# Patient Record
Sex: MALE | Race: WHITE | NOT HISPANIC OR LATINO | Employment: FULL TIME | URBAN - METROPOLITAN AREA
[De-identification: names, ages, dates, MRNs, and addresses within clinical notes are randomized per-mention and may not be internally consistent; named-entity substitution may affect disease eponyms.]

---

## 2023-11-29 ENCOUNTER — OFFICE VISIT (OUTPATIENT)
Dept: URGENT CARE | Facility: PHYSICIAN GROUP | Age: 28
End: 2023-11-29
Payer: COMMERCIAL

## 2023-11-29 VITALS
BODY MASS INDEX: 35.19 KG/M2 | SYSTOLIC BLOOD PRESSURE: 126 MMHG | HEART RATE: 80 BPM | TEMPERATURE: 98.3 F | WEIGHT: 251.32 LBS | RESPIRATION RATE: 16 BRPM | HEIGHT: 71 IN | DIASTOLIC BLOOD PRESSURE: 80 MMHG | OXYGEN SATURATION: 97 %

## 2023-11-29 DIAGNOSIS — J06.9 VIRAL URI WITH COUGH: ICD-10-CM

## 2023-11-29 DIAGNOSIS — J02.9 SORE THROAT: ICD-10-CM

## 2023-11-29 LAB
FLUAV RNA SPEC QL NAA+PROBE: NEGATIVE
FLUBV RNA SPEC QL NAA+PROBE: NEGATIVE
RSV RNA SPEC QL NAA+PROBE: NEGATIVE
S PYO DNA SPEC NAA+PROBE: NOT DETECTED
SARS-COV-2 RNA RESP QL NAA+PROBE: NEGATIVE

## 2023-11-29 PROCEDURE — 3074F SYST BP LT 130 MM HG: CPT | Performed by: STUDENT IN AN ORGANIZED HEALTH CARE EDUCATION/TRAINING PROGRAM

## 2023-11-29 PROCEDURE — 3079F DIAST BP 80-89 MM HG: CPT | Performed by: STUDENT IN AN ORGANIZED HEALTH CARE EDUCATION/TRAINING PROGRAM

## 2023-11-29 PROCEDURE — 0241U POCT CEPHEID COV-2, FLU A/B, RSV - PCR: CPT | Performed by: STUDENT IN AN ORGANIZED HEALTH CARE EDUCATION/TRAINING PROGRAM

## 2023-11-29 PROCEDURE — 87651 STREP A DNA AMP PROBE: CPT | Performed by: STUDENT IN AN ORGANIZED HEALTH CARE EDUCATION/TRAINING PROGRAM

## 2023-11-29 PROCEDURE — 99203 OFFICE O/P NEW LOW 30 MIN: CPT | Performed by: STUDENT IN AN ORGANIZED HEALTH CARE EDUCATION/TRAINING PROGRAM

## 2023-11-29 RX ORDER — BENZONATATE 100 MG/1
100 CAPSULE ORAL 3 TIMES DAILY PRN
Qty: 60 CAPSULE | Refills: 0 | Status: SHIPPED | OUTPATIENT
Start: 2023-11-29

## 2023-11-29 RX ORDER — ALBUTEROL SULFATE 90 UG/1
2 AEROSOL, METERED RESPIRATORY (INHALATION) EVERY 6 HOURS PRN
Qty: 8.5 G | Refills: 0 | Status: SHIPPED | OUTPATIENT
Start: 2023-11-29

## 2023-11-29 ASSESSMENT — ENCOUNTER SYMPTOMS
WHEEZING: 0
PALPITATIONS: 0
SHORTNESS OF BREATH: 0
CHILLS: 1
MYALGIAS: 1
SORE THROAT: 1
FEVER: 1
COUGH: 1
HEADACHES: 0

## 2023-11-29 NOTE — PROGRESS NOTES
"Subjective     Edson Pichardo is a 28 y.o. male who presents with Other (Been feeling ill x last 3 days with sore throat, fever, cough and body aches)            Edson is a 28 y.o. urgent care with symptoms of fever/chills, body aches, sore throat, cough and nasal congestion.  Symptoms started on Sunday.  Patient has been taking OTC DayQuil which has helped somewhat with symptoms.  No shortness of breath/wheezing but he does have some chest congestion.        Review of Systems   Constitutional:  Positive for chills, fever and malaise/fatigue.   HENT:  Positive for congestion and sore throat. Negative for ear pain.    Respiratory:  Positive for cough. Negative for shortness of breath and wheezing.    Cardiovascular:  Negative for chest pain and palpitations.   Musculoskeletal:  Positive for myalgias.   Neurological:  Negative for headaches.   All other systems reviewed and are negative.             Objective     /80 (BP Location: Right arm, Patient Position: Sitting, BP Cuff Size: Adult long)   Pulse 80   Temp 36.8 °C (98.3 °F) (Temporal)   Resp 16   Ht 1.803 m (5' 11\")   Wt 114 kg (251 lb 5.2 oz)   SpO2 97%   BMI 35.05 kg/m²      Physical Exam  Vitals reviewed.   Constitutional:       Appearance: Normal appearance.   HENT:      Head: Normocephalic and atraumatic.      Right Ear: Tympanic membrane, ear canal and external ear normal.      Left Ear: Tympanic membrane, ear canal and external ear normal.      Nose: Nose normal.      Mouth/Throat:      Lips: Pink.      Mouth: Mucous membranes are moist.      Pharynx: Oropharynx is clear. Posterior oropharyngeal erythema present.   Eyes:      Extraocular Movements: Extraocular movements intact.      Conjunctiva/sclera: Conjunctivae normal.      Pupils: Pupils are equal, round, and reactive to light.   Cardiovascular:      Rate and Rhythm: Normal rate and regular rhythm.   Pulmonary:      Effort: Pulmonary effort is normal.      Breath sounds: Normal " breath sounds.   Skin:     General: Skin is warm and dry.   Neurological:      General: No focal deficit present.      Mental Status: He is alert. Mental status is at baseline.                             Assessment & Plan          1. Viral URI with cough  - POCT CoV-2, Flu A/B, RSV by PCR  - albuterol 108 (90 Base) MCG/ACT Aero Soln inhalation aerosol; Inhale 2 Puffs every 6 hours as needed for Shortness of Breath.  Dispense: 8.5 g; Refill: 0  - benzonatate (TESSALON) 100 MG Cap; Take 1 Capsule by mouth 3 times a day as needed for Cough.  Dispense: 60 Capsule; Refill: 0    2. Sore throat  - POCT CEPHEID GROUP A STREP - PCR     Differential diagnoses, supportive care measures and indications for immediate follow-up discussed with patient. Pathogenesis of diagnosis discussed including typical length and natural progression.      Instructed to return to urgent care or nearest emergency department if symptoms fail to improve, for any change in condition, further concerns, or new concerning symptoms.    Patient states understanding and agrees with the plan of care and discharge instructions.

## 2023-11-29 NOTE — LETTER
November 29, 2023    To Whom It May Concern:         This is confirmation that Edson Pichardo attended his scheduled appointment with Concepcion Arias P.A.-C. on 11/29/23. Please excuse work absences through 12/2/23 for medical reasons. Edson can return to work without restrictions on 12/3/23 or earlier as long as symptoms have improved/resolved and there has been no fever for 24 hours.         If you have any questions please do not hesitate to call me at the phone number listed below.    Sincerely,      Concepcion Airas P.A.-C.  [phone]

## 2024-04-02 ENCOUNTER — OCCUPATIONAL MEDICINE (OUTPATIENT)
Dept: URGENT CARE | Facility: CLINIC | Age: 29
End: 2024-04-02
Payer: COMMERCIAL

## 2024-04-02 VITALS
DIASTOLIC BLOOD PRESSURE: 84 MMHG | RESPIRATION RATE: 16 BRPM | HEIGHT: 71 IN | OXYGEN SATURATION: 96 % | BODY MASS INDEX: 34.52 KG/M2 | TEMPERATURE: 97.6 F | WEIGHT: 246.6 LBS | HEART RATE: 71 BPM | SYSTOLIC BLOOD PRESSURE: 124 MMHG

## 2024-04-02 DIAGNOSIS — M77.8 TENDINITIS OF BOTH WRISTS: ICD-10-CM
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
## 2024-04-02 PROCEDURE — 3079F DIAST BP 80-89 MM HG: CPT | Performed by: PHYSICIAN ASSISTANT

## 2024-04-02 PROCEDURE — 99214 OFFICE O/P EST MOD 30 MIN: CPT | Performed by: PHYSICIAN ASSISTANT

## 2024-04-02 PROCEDURE — 3074F SYST BP LT 130 MM HG: CPT | Performed by: PHYSICIAN ASSISTANT

## 2024-04-02 NOTE — LETTER
[facility] [place] [address] [facility] [place] [address] [address]  Phone:  [phone] - Fax:  [phone]   [facility] Progress Report and Disability Certification  Date of Service: 4/2/2024   No Show:  No  Date / Time of Next Visit: 4/9/2024   Claim Information   Patient Name: Edson Pichardo III  Claim Number:     Employer: [employer]  Date of Injury: 8/16/2023     Insurer / TPA: [employer]  ID / SSN:     Occupation:   Diagnosis: Diagnoses of Overuse injury and Tendinitis of both wrists were pertinent to this visit.    Medical Information   Related to Industrial Injury? Yes    Subjective Complaints:  DOI: 8/16/2023.  Bilateral wrist pain.  There is no traumatic injury event or precipitating injury.  He notes he has chronic recurrent bilateral wrist pain left worse than right.  He attributes his pain to repetitive motion and fine motor manipulations associated to his job.  He has no history of previous wrist pain.  Symptoms all started once he began this job.  Pain is in the bilateral radial wrist into the mid forearm.  There is no swelling, deformity, erythema or ecchymosis.  Intermittent tingling but no significant numbness or weakness.  He has tried OTC meds and conservative measures without relief.  He notes symptoms are better on his days off.  No previous injury.  No second job.   Objective Findings: No significant swelling, deformity or ecchymosis.  Slight TTP on the radial wrist into the mid forearm.  Full passive and active range of motion without crepitus.  Slight pain with full flexion.  Negative Phalen test, negative Finkelstein test, negative Tinel's sign.   strength and distal neurovascular intact.   Pre-Existing Condition(s):     Assessment:   Initial Visit    Status: Additional Care Required  Permanent Disability:No    Plan: Medication    Diagnostics:      Comments:       Disability Information   Status: Released to Restricted  Duty    From:  4/2/2024  Through: 4/9/2024 Restrictions are: Temporary   Physical Restrictions   Sitting:    Standing:    Stooping:    Bending:      Squatting:    Walking:    Climbing:    Pushing:  < or = to 4 hrs/day   Pulling:  < or = to 4 hrs/day Other:    Reaching Above Shoulder (L):   Reaching Above Shoulder (R): < or = 4 hrs/day     Reaching Below Shoulder (L):    Reaching Below Shoulder (R):      Not to exceed Weight Limits   Carrying(hrs):   Weight Limit(lb): < or = to 10 pounds Lifting(hrs):   Weight  Limit(lb): < or = to 10 pounds   Comments: Avoid repetitive motion for 1 week.    Repetitive Actions   Hands: i.e. Fine Manipulations from Grasping: < or = to 4 hrs/day   Feet: i.e. Operating Foot Controls:     Driving / Operate Machinery:     Health Care Provider’s Original or Electronic Signature  Irvin De La Cruz P.A.-C. Health Care Provider’s Original or Electronic Signature    Rell Clay DO MPH     Clinic Name / Location: [address] [address] Clinic Phone Number: Dept: [phone]   Appointment Time: 12:45 Pm Visit Start Time: 1:48 PM   Check-In Time:  1:06 Pm Visit Discharge Time:  2:30 PM   Original-Treating Physician or Chiropractor    Page 2-Insurer/TPA    Page 3-Employer    Page 4-Employee

## 2024-04-02 NOTE — LETTER
"    EMPLOYEE’S CLAIM FOR COMPENSATION/ REPORT OF INITIAL TREATMENT  FORM C-4  PLEASE TYPE OR PRINT    EMPLOYEE’S CLAIM - PROVIDE ALL INFORMATION REQUESTED   First Name                    STAR Sandhu Last Name  Marino Birthdate                    1995                Sex  []M  []F Claim Number (Insurer’s Use Only)     Home Address  [address] Age  29 y.o. Height  1.803 m (5' 11\") Weight  112 kg (246 lb 9.6 oz) Social Security Number     [facility] Zip  [address] Telephone  [phone] (home)    Mailing Address  [address] [facility] [place] [facility] Zip  [address] Primary Language Spoken  English    INSURER   THIRD-PARTY   [employer]   Employee's Occupation (Job Title) When Injury or Occupational Disease Occurred      Employer's Name/Company Name  [employer]  Telephone  [phone]    Office Mail Address (Number and Street)  [address]     Date of Injury (if applicable) 8/16/2023               Hours Injury (if applicable)  11:00 PM Date Employer Notified  3/29/2024 Last Day of Work after Injury or Occupational Disease  3/30/2024 Supervisor to Whom Injury     Reported  Michaela Payne   Address or Location of Accident (if applicable)  Work [1]   What were you doing at the time of accident? (if applicable)  N/A    How did this injury or occupational disease occur? (Be specific and answer in detail. Use additional sheet if necessary)  Prolonged and repetitive movements wearing on my arms.   If you believe that you have an occupational disease, when did you first have knowledge of the disability and its relationship to your employment?  N/A Witnesses to the Accident (if applicable)  N/A      Nature of Injury or Occupational Disease  Workers' Compensation  Part(s) of Body Injured or Affected  Lower Arm (L) Lower Arm (R) N/A    I CERTIFY THAT THE ABOVE IS TRUE " AND CORRECT TO T HE BEST OF MY KNOWLEDGE AND THAT I HAVE PROVIDED THIS INFORMATION IN ORDER TO OBTAIN THE BENEFITS OF [place], OR [place]).  I HEREBY AUTHORIZE ANY PHYSICIAN, CHIROPRACTOR, SURGEON, PRACTITIONER OR ANY OTHER PERSON, ANY HOSPITAL, INCLUDING [facility] OR [facility], ANY  MEDICAL SERVICE ORGANIZATION, ANY INSURANCE COMPANY, OR OTHER INSTITUTION OR ORGANIZATION TO RELEASE TO EACH OTHER, ANY MEDICAL OR OTHER INFORMATION, INCLUDING BENEFITS PAID OR PAYABLE, PERTINENT TO THIS INJURY OR DISEASE, EXCEPT INFORMATION RELATIVE TO DIAGNOSIS, TREATMENT AND/OR COUNSELING FOR AIDS, PSYCHOLOGICAL CONDITIONS, ALCOHOL OR CONTROLLED SUBSTANCES, FOR WHICH I MUST GIVE SPECIFIC AUTHORIZATION.  A PHOTOSTAT OF THIS AUTHORIZATION SHALL BE VALID AS THE ORIGINAL.     Date 4/2/24   [facility] Employee’s Original or  *Electronic Signature   THIS REPORT MUST BE COMPLETED AND MAILED WITHIN 3 WORKING DAYS OF TREATMENT   [facility]    Name of Facility  [facility]   Date 4/2/2024 Diagnosis and Description of Injury or Occupational Disease  (X50.3XXA) Overuse injury  (M77.8) Tendinitis of both wrists  Diagnoses of Overuse injury and Tendinitis of both wrists were pertinent to this visit. Is there evidence that the injured employee was under the influence of alcohol and/or another controlled substance at the time of accident?  []No  [] Yes (if yes, please explain)   Hour 1:48 PM  No   Treatment: Overuse/tendinitis type injury  RICE therapy and OTC meds    Have you advised the patient to remain off work five days or more?   [] Yes Indicate dates: From   To    []No If no, is the injured employee capable of: [] full duty [] modified duty                                                             No  Yes  If modified duty, specify any limitations / restrictions:  No pushing or pulling for more than 4  hours.  No fine motor manipulations with grasping for more than 4 hours.  10 pound weight limit.  Recommend avoiding repetitive motions of his wrist for 1 week.                                                                                                                                                                                                                                                                                                                                                                                                               X-Ray Findings:      From information given by the employee, together with medical evidence, can you directly connect this injury or occupational disease as job incurred?  []Yes   [] No Yes    Is additional medical care by a physician indicated? []Yes [] No  Yes    Do you know of any previous injury or disease contributing to this condition or occupational disease? []Yes [] No (Explain if yes)                          No   Date  4/2/2024 Print Health Care Provider’s Name  Bobbi De La Cruz P.A.-C. I certify that the employer’s copy of  this form was delivered to the employer on:   Address  [address] [place] [address] INSURER'S USE ONLY                       [facility]  [address] Provider’s Tax ID Number  [id number]   Telephone  Dept: [phone]    Health Care Provider’s Original or Electronic Signature  e-BOBBI Nguyen P.A.-C. Degree (MD,DO, DC,PANoeC,APRN)  PA-C  Choose (if applicable)      ORIGINAL - TREATING HEALTHCARE PROVIDER PAGE 2 - INSURER/TPA PAGE 3 - EMPLOYER PAGE 4 - EMPLOYEE             Form C-4 (rev.08/23)        BRIEF DESCRIPTION OF RIGHTS AND BENEFITS  (Pursuant to [place] 616[place])    Notice of Injury or Occupational Disease (Incident Report Form C-1): If an injury or occupational disease (OD) arises out of and in the course of employment, you must provide written notice to your employer as soon as practicable, but no later than 7 days after  "the accident or OD. Your employer shall maintain a sufficient supply of the required forms.    Claim for Compensation (Form C-4): If medical treatment is sought, the form C-4 is available at the place of initial treatment. A completed \"Claim for Compensation\" (Form C-4) must be filed within 90 days after an accident or OD. The treating physician or chiropractor must, within 3 working days after treatment, complete and mail to the employer, the employer's insurer and third-party , the Claim for Compensation.    Medical Treatment: If you require medical treatment for your on-the-job injury or OD, you may be required to select a physician or chiropractor from a list provided by your workers’ compensation insurer, if it has contracted with an Organization for Managed Care (MCO) or Preferred Provider Organization (PPO) or providers of health care. If your employer has not entered into a contract with an MCO or PPO, you may select a physician or chiropractor from the Panel of Physicians and Chiropractors. Any medical costs related to your industrial injury or OD will be paid by your insurer.    Temporary Total Disability (TTD): If your doctor has certified that you are unable to work for a period of at least 5 consecutive days, or 5 cumulative days in a 20-day period, or places restrictions on you that your employer does not accommodate, you may be entitled to TTD compensation.    Temporary Partial Disability (TPD): If the wage you receive upon reemployment is less than the compensation for TTD to which you are entitled, the insurer may be required to pay you TPD compensation to make up the difference. TPD can only be paid for a maximum of 24 months.    Permanent Partial Disability (PPD): When your medical condition is stable and there is an indication of a PPD as a result of your injury or OD, within 30 days, your insurer must arrange for an evaluation by a rating physician or chiropractor to determine the " degree of your PPD. The amount of your PPD award depends on the date of injury, the results of the PPD evaluation, your age and wage.    Permanent Total Disability (PTD): If you are medically certified by a treating physician or chiropractor as permanently and totally disabled and have been granted a PTD status by your insurer, you are entitled to receive monthly benefits not to exceed 66 2/3% of your average monthly wage. The amount of your PTD payments is subject to reduction if you previously received a lump-sum PPD award.    Vocational Rehabilitation Services: You may be eligible for vocational rehabilitation services if you are unable to return to the job due to a permanent physical impairment or permanent restrictions as a result of your injury or occupational disease.    Transportation and Per Johann Reimbursement: You may be eligible for travel expenses and per johann associated with medical treatment.    Reopening: You may be able to reopen your claim if your condition worsens after claim closure.     Appeal Process: If you disagree with a written determination issued by the insurer or the insurer does not respond to your request, you may appeal to the Department of Administration, , by following the instructions contained in your determination letter. You must appeal the determination within 70 days from the date of the determination letter at [address], [address], [place], [place] [address], or [address] [facility] [address], [place] [address]. If you disagree with the  decision, you may appeal to the Department of Administration, . You must file your appeal within 30 days from the date of the  decision letter at [address], [address], [place] [address], or [address] [facility] [address], [place] [address]. If you disagree with a decision of an , you may file a petition for judicial review  with the District Court. You must do so within 30 days of the Appeal Officer’s decision. You may be represented by an  at your own expense or you may contact the [facility] for possible representation.    [place]  for Injured Workers (NAIW): If you disagree with a  decision, you may request that NAIW represent you without charge at an  Hearing. For information regarding denial of benefits, you may contact the [facility] at: [address], [address], [place] [address], [phone], or [address], [place] [address], [phone]    To File a Complaint with the Division: If you wish to file a complaint with the  of the Division of Industrial Relations (DIR),  please contact the Workers’ Compensation Section, [address], [address], [place], [place] [address], telephone [phone], or [address], [place], [place] [address], telephone [phone].    For assistance with Workers’ Compensation Issues: You may contact the [facility] Office for Consumer Health Assistance, [address], [place], [place] [address], Toll Free 1-739.459.8125, Web site: http://[facility].[place].gov/Programs/RENEA E-mail: [email or web address].[facility]              __________________________________________________________________                                    _________________            Employee Name / Signature                                                                                                                            Date                                                                                                                                                                                                                              D-2 (rev. 10/20)

## 2024-04-03 NOTE — PROGRESS NOTES
"Subjective     Edson Pichardo III is a very pleasant 29 y.o. male who presents with Arm Pain (WC NEW DOI:8/16/23, Bilateral arm and wrist pain x 6 months)      DOI: 8/16/2023.  Bilateral wrist pain.  There is no traumatic injury event or precipitating injury.  He notes he has chronic recurrent bilateral wrist pain left worse than right.  He attributes his pain to repetitive motion and fine motor manipulations associated to his job.  He has no history of previous wrist pain.  Symptoms all started once he began this job.  Pain is in the bilateral radial wrist into the mid forearm.  There is no swelling, deformity, erythema or ecchymosis.  Intermittent tingling but no significant numbness or weakness.  He has tried OTC meds and conservative measures without relief.  He notes symptoms are better on his days off.  No previous injury.  No second job.     Arm Pain         ROS           Objective     /84 (BP Location: Left arm, Patient Position: Sitting, BP Cuff Size: Large adult)   Pulse 71   Temp 36.4 °C (97.6 °F) (Temporal)   Resp 16   Ht 1.803 m (5' 11\")   Wt 112 kg (246 lb 9.6 oz)   SpO2 96%   BMI 34.39 kg/m²      Physical Exam    No significant swelling, deformity or ecchymosis.  Slight TTP on the radial wrist into the mid forearm.  Full passive and active range of motion without crepitus.  Slight pain with full flexion.  Negative Phalen test, negative Finkelstein test, negative Tinel's sign.   strength and distal neurovascular intact.                   Assessment & Plan        1. Overuse injury        2. Tendinitis of both wrists          This is a very pleasant 29-year-old male presenting with overuse tendinitis type injury of bilateral wrists left worse than right.  He will be placed on work restrictions.  He will use RICE therapy and OTC meds.  He will follow-up in 1 week, may need imaging or referral.      Return to clinic or go to ED if symptoms worsen or persist. Red flag symptoms and " indications for ED discussed at length. Patient voices understanding. All side effects and potential interactions of medication discussed including allergic response, GI upset, sedation, etc.    Please note that this dictation was created using voice recognition software. I have made every reasonable attempt to correct obvious errors, but I expect that there are errors of grammar and possibly content that I did not discover before finalizing the note.

## 2024-04-09 ENCOUNTER — OCCUPATIONAL MEDICINE (OUTPATIENT)
Dept: URGENT CARE | Facility: CLINIC | Age: 29
End: 2024-04-09
Payer: COMMERCIAL

## 2024-04-09 VITALS
DIASTOLIC BLOOD PRESSURE: 72 MMHG | SYSTOLIC BLOOD PRESSURE: 128 MMHG | TEMPERATURE: 97.6 F | HEART RATE: 76 BPM | BODY MASS INDEX: 34.44 KG/M2 | HEIGHT: 71 IN | WEIGHT: 246 LBS | RESPIRATION RATE: 16 BRPM | OXYGEN SATURATION: 96 %

## 2024-04-09 DIAGNOSIS — M77.8 TENDINITIS OF BOTH WRISTS: ICD-10-CM

## 2024-04-09 PROCEDURE — 3078F DIAST BP <80 MM HG: CPT | Performed by: NURSE PRACTITIONER

## 2024-04-09 PROCEDURE — 99213 OFFICE O/P EST LOW 20 MIN: CPT | Performed by: NURSE PRACTITIONER

## 2024-04-09 PROCEDURE — 3074F SYST BP LT 130 MM HG: CPT | Performed by: NURSE PRACTITIONER

## 2024-04-09 NOTE — LETTER
[facility] [place] [id number] [facility] [address] [place] [address]  Phone:  [phone] - Fax:  [phone]   [facility] Progress Report and Disability Certification  Date of Service: 4/9/2024   No Show:  No  Date / Time of Next Visit: 4/16/2024   Claim Information   Patient Name: Edson Pichardo III  Claim Number:     Employer: [employer]  Date of Injury: 8/16/2023     Insurer / TPA: [employer]  ID / SSN:     Occupation:   Diagnosis: The encounter diagnosis was Tendinitis of both wrists.    Medical Information   Related to Industrial Injury? Yes    Subjective Complaints:  COPIED FROM PREVIOUS VISIT: DOI: 8/16/2023.  Bilateral wrist pain.  There is no traumatic injury event or precipitating injury.  He notes he has chronic recurrent bilateral wrist pain left worse than right.  He attributes his pain to repetitive motion and fine motor manipulations associated to his job.  He has no history of previous wrist pain.  Symptoms all started once he began this job.  Pain is in the bilateral radial wrist into the mid forearm.  There is no swelling, deformity, erythema or ecchymosis.  Intermittent tingling but no significant numbness or weakness.  He has tried OTC meds and conservative measures without relief.  He notes symptoms are better on his days off.  No previous injury.  No second job.       Today, 4/9/24: Patient denies much improvement.  He still continues to have pain in both wrists, specifically with any fine hand movement or repetitive motion.  He also reports some intermittent tingling sensation to his fingers.  He is tolerating work restrictions.   Objective Findings: A/Ox4. NAD.  Right wrist: Full range of motion, positive Finkelstein's, distal neurovascular intact, cap refill is brisk, strength 5/5.  Left wrist: Full range of motion, negative Finkelstein's, neurovascular intact, cap refill is brisk, strength 5/5.    Pre-Existing Condition(s):  Denies   Assessment:   Condition Same    Status: Additional Care Required  Permanent Disability:No    Plan: Medication  Comments:OTC NSAIDs encouraged, wrist brace is applied, work restrictions, return to clinic in 1 week.    Diagnostics:   Comments:N/A    Comments:       Disability Information   Status: Released to Restricted Duty    From:  4/9/2024  Through: 4/16/2024 Restrictions are: Temporary   Physical Restrictions   Sitting:    Standing:    Stooping:    Bending:      Squatting:    Walking:    Climbing:    Pushing:  < or = to 1 hr/day   Pulling:  < or = to 1 hr/day Other:    Reaching Above Shoulder (L): < or = to 1 hr/day Reaching Above Shoulder (R): < or = 1 hrs/day     Reaching Below Shoulder (L):    Reaching Below Shoulder (R):      Not to exceed Weight Limits   Carrying(hrs):   Weight Limit(lb): < or = to 10 pounds Lifting(hrs):   Weight  Limit(lb): < or = to 10 pounds   Comments:      Repetitive Actions   Hands: i.e. Fine Manipulations from Grasping: < or = to 1 hr/day   Feet: i.e. Operating Foot Controls:     Driving / Operate Machinery:     Health Care Provider’s Original or Electronic Signature  DEEPAK Tilley Health Care Provider’s Original or Electronic Signature    Rell Clay DO MPH     Clinic Name / Location: [place] [address]  [place] [address] Clinic Phone Number: Dept: [phone]   Appointment Time: 11:00 Am Visit Start Time: 11:04 AM   Check-In Time:  10:52 Am Visit Discharge Time:     Original-Treating Physician or Chiropractor    Page 2-Insurer/TPA    Page 3-Employer    Page 4-Employee

## 2024-04-09 NOTE — PROGRESS NOTES
Chief Complaint   Patient presents with    Follow-Up     Pt states no improvement, WC FV       HISTORY OF PRESENT ILLNESS: Patient is a pleasant 29 y.o. male who presents to urgent care today with a work comp complaint of bilateral wrist pain. COPIED FROM PREVIOUS VISIT: DOI: 8/16/2023.  Bilateral wrist pain.  There is no traumatic injury event or precipitating injury.  He notes he has chronic recurrent bilateral wrist pain left worse than right.  He attributes his pain to repetitive motion and fine motor manipulations associated to his job.  He has no history of previous wrist pain.  Symptoms all started once he began this job.  Pain is in the bilateral radial wrist into the mid forearm.  There is no swelling, deformity, erythema or ecchymosis.  Intermittent tingling but no significant numbness or weakness.  He has tried OTC meds and conservative measures without relief.  He notes symptoms are better on his days off.  No previous injury.  No second job.       Today, 4/9/24: Patient denies much improvement.  He still continues to have pain in both wrists, specifically with any fine hand movement or repetitive motion.  He also reports some intermittent tingling sensation to his fingers.  He is tolerating work restrictions.      PMH: No pertinent past medical history to this problem  MEDS: Medications were reviewed in Epic  ALLERGIES: Allergies were reviewed in Epic  FH: No pertinent family history to this problem      ROS:  Review of Systems   Constitutional: Negative for fever, chills, weight loss, malaise, and fatigue.   HENT: Negative for ear pain, nosebleeds, congestion, sore throat and neck pain.    Eyes: Negative for vision changes.   Neuro: Negative for headache, sensory changes, weakness, seizure, LOC.   Cardiovascular: Negative for chest pain, palpitations, orthopnea and leg swelling.   Respiratory: Negative for cough, sputum production, shortness of breath and wheezing.   Gastrointestinal: Negative for  "abdominal pain, nausea, vomiting or diarrhea.   Genitourinary: Negative for dysuria, urgency and frequency.  Musculoskeletal: Positive for bilateral wrist pain.  Negative for falls, neck pain, back pain, joint pain, myalgias.   Skin: Negative for rash, diaphoresis.     Exam:  /72   Pulse 76   Temp 36.4 °C (97.6 °F)   Resp 16   Ht 1.803 m (5' 11\")   Wt 112 kg (246 lb)   SpO2 96%   General: well-nourished, well-developed male in NAD  Head: normocephalic, atraumatic  Eyes: PERRLA, no conjunctival injection, acuity grossly intact, lids normal.  Ears: normal shape and symmetry, no tenderness, no discharge. External canals are without any significant edema or erythema. Tympanic membranes are without any inflammation, no effusion. Gross auditory acuity is intact.  Nose: symmetrical without tenderness, no discharge.  Mouth/Throat: reasonable hygiene, no erythema, exudates or tonsillar enlargement.  Neck: no masses, range of motion within normal limits, no tracheal deviation. No obvious thyroid enlargement.   Lymph: no cervical adenopathy. No supraclavicular adenopathy.   Neuro: alert and oriented. Cranial nerves 1-12 grossly intact. No sensory deficit.   Cardiovascular: regular rate and rhythm. No edema.  Pulmonary: no distress. Chest is symmetrical with respiration, no wheezes, crackles, or rhonchi.   Musculoskeletal: no clubbing, appropriate muscle tone, gait is stable. Right wrist: Full range of motion, positive Finkelstein's, distal neurovascular intact, cap refill is brisk, strength 5/5.  Left wrist: Full range of motion, negative Finkelstein's, neurovascular intact, cap refill is brisk, strength 5/5.   Skin: warm, dry, intact, no clubbing, no cyanosis, no rashes.   Psych: appropriate mood, affect, judgement.         Assessment/Plan:  1. Tendinitis of both wrists            The patient is a pleasant 29-year-old who presents with bilateral wrist pain after repetitive motion while at work.  There are signs of " tendinitis.  I discussed potential for possible carpal tunnel as well.  Patient placed in bilateral wrist braces.  Encouraged to wear 24 hours/day for the next week.  May consider transition to nighttime use with improvement.  OTC NSAIDs encouraged, wrist brace is applied, work restrictions, return to clinic in 1 week.   Supportive care, differential diagnoses, and indications for immediate follow-up discussed with patient.   Pathogenesis of diagnosis discussed including typical length and natural progression.   Instructed to return to clinic or nearest emergency department sooner for any change in condition, further concerns, or worsening of symptoms.  Patient states understanding of the plan of care and discharge instructions.          Please note that this dictation was created using voice recognition software. I have made every reasonable attempt to correct obvious errors, but I expect that there are errors of grammar and possibly content that I did not discover before finalizing the note.  Previous clinic visit encounter reviewed and considered in medical decision making today.       ANTON Tilley.

## 2024-04-12 ENCOUNTER — OFFICE VISIT (OUTPATIENT)
Dept: URGENT CARE | Facility: CLINIC | Age: 29
End: 2024-04-12
Payer: COMMERCIAL

## 2024-04-12 VITALS
SYSTOLIC BLOOD PRESSURE: 122 MMHG | HEART RATE: 90 BPM | DIASTOLIC BLOOD PRESSURE: 88 MMHG | BODY MASS INDEX: 34.22 KG/M2 | OXYGEN SATURATION: 98 % | TEMPERATURE: 98 F | RESPIRATION RATE: 16 BRPM | WEIGHT: 244.4 LBS | HEIGHT: 71 IN

## 2024-04-12 DIAGNOSIS — J39.2 THROAT IRRITATION: ICD-10-CM

## 2024-04-12 LAB — S PYO DNA SPEC NAA+PROBE: NOT DETECTED

## 2024-04-12 PROCEDURE — 87651 STREP A DNA AMP PROBE: CPT | Performed by: PHYSICIAN ASSISTANT

## 2024-04-12 PROCEDURE — 3074F SYST BP LT 130 MM HG: CPT | Performed by: PHYSICIAN ASSISTANT

## 2024-04-12 PROCEDURE — 3079F DIAST BP 80-89 MM HG: CPT | Performed by: PHYSICIAN ASSISTANT

## 2024-04-12 PROCEDURE — 99213 OFFICE O/P EST LOW 20 MIN: CPT | Performed by: PHYSICIAN ASSISTANT

## 2024-04-12 RX ORDER — DEXAMETHASONE SODIUM PHOSPHATE 10 MG/ML
10 INJECTION INTRAMUSCULAR; INTRAVENOUS ONCE
Status: COMPLETED | OUTPATIENT
Start: 2024-04-12 | End: 2024-04-12

## 2024-04-12 RX ADMIN — DEXAMETHASONE SODIUM PHOSPHATE 10 MG: 10 INJECTION INTRAMUSCULAR; INTRAVENOUS at 09:40

## 2024-04-12 ASSESSMENT — ENCOUNTER SYMPTOMS
CHILLS: 0
SHORTNESS OF BREATH: 1
FEVER: 0
COUGH: 1
HEMOPTYSIS: 0
WHEEZING: 0
SORE THROAT: 1
SPUTUM PRODUCTION: 0
PALPITATIONS: 0

## 2024-04-12 NOTE — LETTER
April 12, 2024         Patient: Edson Pichardo III   YOB: 1995   Date of Visit: 4/12/2024           To Whom it May Concern:    Edson Pichardo was seen in my clinic on 4/12/2024.  Please excuse his absence today.    If you have any questions or concerns, please don't hesitate to call.        Sincerely,           Dougie Sandhu P.A.-C.  Electronically Signed

## 2024-04-12 NOTE — PROGRESS NOTES
"  Subjective:   Edson Pichardo III is a 29 y.o. male who presents today with   Chief Complaint   Patient presents with    Difficulty Swallowing       Shortness of Breath  This is a new problem. The current episode started 1 to 4 weeks ago. The problem occurs constantly. The problem has been unchanged. Associated symptoms include a sore throat. Pertinent negatives include no chest pain, fever, hemoptysis, sputum production or wheezing.     Patient states approximately 2 weeks ago he was drinking some warm/hot coffee and noticed some irritation of his throat as he drank it too fast.  He states about a week later he accidentally inhaled one of his girlfriends hairs and that seemed to cause some irritation to his throat as well.  Patient states it feels more like difficulty swallowing then throat pain.    PMH:  has no past medical history on file.  MEDS: No current outpatient medications on file.    Current Facility-Administered Medications:     dexamethasone (Decadron) injection (check route below) 10 mg, 10 mg, Oral, Once, RENETTA Arriaga.A.-KRISTI.  ALLERGIES: No Known Allergies  SURGHX: No past surgical history on file.  SOCHX:  reports that he has never smoked. He has never used smokeless tobacco. He reports that he does not currently use alcohol. He reports that he does not use drugs.  FH: Reviewed with patient, not pertinent to this visit.     Review of Systems   Constitutional:  Negative for chills and fever.   HENT:  Positive for sore throat.    Respiratory:  Positive for cough and shortness of breath. Negative for hemoptysis, sputum production and wheezing.    Cardiovascular:  Negative for chest pain and palpitations.      Objective:   /88   Pulse 90   Temp 36.7 °C (98 °F) (Temporal)   Resp 16   Ht 1.803 m (5' 11\")   Wt 111 kg (244 lb 6.4 oz)   SpO2 98%   BMI 34.09 kg/m²   Physical Exam  Vitals and nursing note reviewed.   Constitutional:       General: He is not in acute distress.     Appearance: " Normal appearance. He is well-developed. He is not ill-appearing or toxic-appearing.   HENT:      Head: Normocephalic and atraumatic.      Right Ear: Hearing normal.      Left Ear: Hearing normal.      Mouth/Throat:      Mouth: Mucous membranes are moist.      Pharynx: Posterior oropharyngeal erythema present. No oropharyngeal exudate.   Cardiovascular:      Rate and Rhythm: Normal rate and regular rhythm.      Heart sounds: Normal heart sounds.   Pulmonary:      Effort: Pulmonary effort is normal. No respiratory distress.      Breath sounds: Normal breath sounds. No stridor. No wheezing, rhonchi or rales.   Musculoskeletal:      Comments: Normal movement in all 4 extremities   Skin:     General: Skin is warm and dry.   Neurological:      Mental Status: He is alert.      Coordination: Coordination normal.   Psychiatric:         Mood and Affect: Mood normal.       STREP A -    Assessment/Plan:   Assessment    1. Throat irritation  - dexamethasone (Decadron) injection (check route below) 10 mg  - POCT GROUP A STREP, PCR    I suspect likely irritation to the throat at this time as there is no signs of infection.  We will make sure to rule out strep.  Would recommend trying his inhaler as well to help with any shortness of breath he is having.  No chest pain on exam and he states he is not having any sharp stabbing chest pain.  He states it feels more like tightness when he takes very deep inspiration.  No indication for imaging at this time.  Could also be postnasal drainage from allergy-like symptoms causing irritation of the throat.  Would recommend a trial over-the-counter nondrowsy antihistamine per 's instructions.  Patient can also trial albuterol to see if this helps with the symptoms of likely causing asthma-like reaction from allergic irritation.  No signs of infection today.  No indication for imaging or testing.    Differential diagnosis, natural history, supportive care, and indications for  immediate follow-up discussed.   Patient given instructions and understanding of medications and treatment.    If not improving in 3-5 days, F/U with PCP or return to UC if symptoms worsen.    Patient agreeable to plan.      Please note that this dictation was created using voice recognition software. I have made every reasonable attempt to correct obvious errors, but I expect that there are errors of grammar and possibly content that I did not discover before finalizing the note.    Dougie Sandhu PA-C

## 2024-04-16 ENCOUNTER — OCCUPATIONAL MEDICINE (OUTPATIENT)
Dept: URGENT CARE | Facility: CLINIC | Age: 29
End: 2024-04-16
Payer: COMMERCIAL

## 2024-04-16 VITALS
RESPIRATION RATE: 18 BRPM | WEIGHT: 246.6 LBS | BODY MASS INDEX: 35.3 KG/M2 | TEMPERATURE: 97.5 F | SYSTOLIC BLOOD PRESSURE: 122 MMHG | HEIGHT: 70 IN | HEART RATE: 92 BPM | DIASTOLIC BLOOD PRESSURE: 80 MMHG | OXYGEN SATURATION: 96 %
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
## 2024-04-16 DIAGNOSIS — M77.8 TENDINITIS OF BOTH WRISTS: ICD-10-CM

## 2024-04-16 PROCEDURE — 3079F DIAST BP 80-89 MM HG: CPT | Performed by: PHYSICIAN ASSISTANT

## 2024-04-16 PROCEDURE — 99213 OFFICE O/P EST LOW 20 MIN: CPT | Performed by: PHYSICIAN ASSISTANT

## 2024-04-16 PROCEDURE — 3074F SYST BP LT 130 MM HG: CPT | Performed by: PHYSICIAN ASSISTANT

## 2024-04-16 ASSESSMENT — ENCOUNTER SYMPTOMS: TINGLING: 1

## 2024-04-16 NOTE — LETTER
"                  PHYSICIAN’S AND CHIROPRACTIC PHYSICIAN'S                       PROGRESS REPORT  CERTIFICATION OF DISABILITY Claim Number:        Social Security Number:     Patient’s Name:Edson Pichardo III Date of Injury:  8/16/2023     Employer:  [employer]  Name of O (if applicable)   Patient’s Job Description/Occupation:      Previous Injuries/Diseases/Surgeries Contributing to the Condition:      Diagnosis:  Diagnoses of Overuse injury and Tendinitis of both wrists were pertinent to this visit.   Related to the Industrial Injury? Explain:  Yes DOI: 8/16/2023 4/2/2024, Visit # 1: Bilateral wrist pain.  There is no traumatic injury event or precipitating injury.  He notes he has chronic recurrent bilateral wrist pain left worse than right.  He attributes his pain to repetitive motion and fine motor manipulations associated to his job.  He has no history of previous wrist pain.  Symptoms all started once he began this job.  Pain is in the bilateral radial wrist into the mid forearm.  There is no swelling, deformity, erythema or ecchymosis.  Intermittent tingling but no significant numbness or weakness.  He has tried OTC meds and conservative measures without relief.  He notes symptoms are better on his days off.  No previous injury.  No second job.     4/9/2024, Visit # 2: \"Patient denies much improvement. He still continues to have pain in both wrists, specifically with any fine hand movement or repetitive motion. He also reports some intermittent tingling sensation to his fingers. He is tolerating work restrictions.\"    4/16/2024, Visit # 3: Patient still does not report any significant improvement since previous visit.  Continues to have pain in both of his wrists, especially with repetitive movements.  He is also still noting some intermittent tingling into his fingers.  States that after his last visit he was not able to follow the work restrictions for the first day " and did have some increased pain.  Was then following the restrictions and he felt a little bit better and back to what his baseline has been over the past few weeks.  He states that he was part of a massively offered for [employer] and so work restrictions for the coming weeks should not be an issue as he will not be going to work.   Objective Medical Findings:  Right wrist: Full range of motion, positive Finkelstein's, distal neurovascular intact, cap refill is brisk, strength 5/5.  Left wrist: Full range of motion, negative Finkelstein's, neurovascular intact, cap refill is brisk, strength 5/5.     []  None - Discharged                         Stable     [x]  Yes     []  No                           Ratable     []  Yes     []  No   []  Generally Improved                        []  Condition Worsened                              [x]  Condition Same         May Have Suffered a Permanent Disability     []  Yes     []  No   Treatment Plan: Transfer CarePT     [] No Change in Therapy                    [x] PT/OT Prescribed                   [] Medication May be Used While Working    [] Case Management                          [] PT/OT Discontinued  []  Consultation    [] Further Diagnostic Studies    []  Prescription(s)                        [] Released to FULL DUTY /No Restrictions on (Date):                                                                                           [] Certified TOTALLY TEMPORARILY DISABLED (Indicate Dates): From:                       To:                               [x] Released to RESTRICTED/Modified Duty on (Date): From:       4/16/2024  To:      4/24/2024                          Restrictions Are:     []  Permanent[x]  Temporary   [] No Sitting                   []  No Standing          []  No Pulling             []  Other: Continue weight restrictions of no lifting, carrying, pushing, or pulling anything heavier than 10 pounds.  He should try to limit his 5 manipulations from  grasping to less than 2 hours/day.  He should also continue to wear the wrist braces while at work and while sleeping.    [] No Bending at Waist  []  No Stooping          []  No Lifting                                                                            [] No Carrying                []  No Walking           []  Lifting Restricted to (lbs.):< or = to 10 pounds  [] No Pushing                 []  No Climbing         []  No Reaching Above Shoulders   Date of Next Visit: 4/24/2024  11:00 AM  Date of this Exam:  4/16/2024 Physician/Chiropractic Physician Name: Lesley Moore P.A.-C. Physician/Chiropractic Physician Signature:   Rell Clay DO MPH   [id number] (Rev. 2/24)

## 2024-04-16 NOTE — PROGRESS NOTES
"Subjective     Edson Pichardo III is a 29 y.o. male who presents with Follow-Up (Workers comp)      DOI: 8/16/2023 4/2/2024, Visit # 1: Bilateral wrist pain.  There is no traumatic injury event or precipitating injury.  He notes he has chronic recurrent bilateral wrist pain left worse than right.  He attributes his pain to repetitive motion and fine motor manipulations associated to his job.  He has no history of previous wrist pain.  Symptoms all started once he began this job.  Pain is in the bilateral radial wrist into the mid forearm.  There is no swelling, deformity, erythema or ecchymosis.  Intermittent tingling but no significant numbness or weakness.  He has tried OTC meds and conservative measures without relief.  He notes symptoms are better on his days off.  No previous injury.  No second job.     4/9/2024, Visit # 2: \"Patient denies much improvement. He still continues to have pain in both wrists, specifically with any fine hand movement or repetitive motion. He also reports some intermittent tingling sensation to his fingers. He is tolerating work restrictions.\"    4/16/2024, Visit # 3: Patient still does not report any significant improvement since previous visit.  Continues to have pain in both of his wrists, especially with repetitive movements.  He is also still noting some intermittent tingling into his fingers.  States that after his last visit he was not able to follow the work restrictions for the first day and did have some increased pain.  Was then following the restrictions and he felt a little bit better and back to what his baseline has been over the past few weeks.  He states that he was part of a massively offered for [employer] and so work restrictions for the coming weeks should not be an issue as he will not be going to work.        Review of Systems   Musculoskeletal:  Positive for joint pain.   Neurological:  Positive for tingling.       PMH: No pertinent past medical history to " "this problem  MEDS: Medications were reviewed in Epic  ALLERGIES: Allergies were reviewed in Epic  FH: No pertinent family history to this problem        Objective     /80   Pulse 92   Temp 36.4 °C (97.5 °F) (Temporal)   Resp 18   Ht 1.778 m (5' 10\")   Wt 112 kg (246 lb 9.6 oz)   SpO2 96%   BMI 35.38 kg/m²      Physical Exam  Constitutional:       General: He is not in acute distress.     Appearance: He is not diaphoretic.   HENT:      Head: Normocephalic and atraumatic.      Right Ear: External ear normal.      Left Ear: External ear normal.   Eyes:      Conjunctiva/sclera: Conjunctivae normal.      Pupils: Pupils are equal, round, and reactive to light.   Pulmonary:      Effort: Pulmonary effort is normal. No respiratory distress.   Musculoskeletal:      Cervical back: Normal range of motion.      Comments: Right wrist: Full range of motion, positive Finkelstein's, distal neurovascular intact, cap refill is brisk, strength 5/5.  Left wrist: Full range of motion, negative Finkelstein's, neurovascular intact, cap refill is brisk, strength 5/5.    Skin:     Findings: No rash.   Neurological:      Mental Status: He is alert and oriented to person, place, and time.   Psychiatric:         Mood and Affect: Mood and affect normal.         Cognition and Memory: Memory normal.         Judgment: Judgment normal.             Assessment & Plan     1. Overuse injury  - Referral to Occupational Medicine  - Referral to Physical Therapy    2. Tendinitis of both wrists  - Referral to Occupational Medicine  - Referral to Physical Therapy    Continue weight restrictions of no lifting, carrying, pushing, or pulling anything heavier than 10 pounds.  He should try to limit his 5 manipulations from grasping to less than 2 hours/day.  He should also continue to wear the wrist braces while at work and while sleeping.  He may also continue to use OTC NSAIDs and apply ice/heat to the affected area as needed.  He will follow-up " with occupational medicine next week.  Referral to physical therapy also placed due to his persistent symptoms.

## 2024-04-24 ENCOUNTER — OCCUPATIONAL MEDICINE (OUTPATIENT)
Dept: OCCUPATIONAL MEDICINE | Facility: CLINIC | Age: 29
End: 2024-04-24
Payer: COMMERCIAL

## 2024-04-24 VITALS
SYSTOLIC BLOOD PRESSURE: 120 MMHG | WEIGHT: 251 LBS | DIASTOLIC BLOOD PRESSURE: 82 MMHG | HEIGHT: 70 IN | HEART RATE: 73 BPM | BODY MASS INDEX: 35.93 KG/M2 | TEMPERATURE: 96.7 F | RESPIRATION RATE: 16 BRPM | OXYGEN SATURATION: 95 %
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
## 2024-04-24 DIAGNOSIS — M77.8 TENDONITIS OF BOTH WRISTS: ICD-10-CM

## 2024-04-24 PROCEDURE — 99213 OFFICE O/P EST LOW 20 MIN: CPT | Performed by: NURSE PRACTITIONER

## 2024-04-24 PROCEDURE — 3079F DIAST BP 80-89 MM HG: CPT | Performed by: NURSE PRACTITIONER

## 2024-04-24 PROCEDURE — 3074F SYST BP LT 130 MM HG: CPT | Performed by: NURSE PRACTITIONER

## 2024-04-24 NOTE — PROGRESS NOTES
Subjective:     Edson Pichardo III is a 29 y.o. male who presents for Follow-Up (Doi: 8/16/23 ARMS, BETTER)      (Copied from initial visit) DOI: 8/16/2023.  Bilateral wrist pain.  There is no traumatic injury event or precipitating injury.  He notes he has chronic recurrent bilateral wrist pain left worse than right.  He attributes his pain to repetitive motion and fine motor manipulations associated to his job.  He has no history of previous wrist pain.  Symptoms all started once he began this job.  Pain is in the bilateral radial wrist into the mid forearm.  There is no swelling, deformity, erythema or ecchymosis.  Intermittent tingling but no significant numbness or weakness.  He has tried OTC meds and conservative measures without relief.  He notes symptoms are better on his days off.  No previous injury.  No second job.      Patient seen in urgent care x 3 for this injury. Today symptoms are unchanged.  He states it feels like the symptoms and the discomfort is always there, but it does range in severity.  He states sometimes there is a pulling sensation at the base of both thumbs and it radiates up his arms.  He does not have any numbness, weakness, tingling, or swelling.  He does not need any OTC medications and will take it if necessary.  He is wearing the wrist splints most of the time, recommended work and sleep to decrease weakness.  Patient is amenable to this.  He has an ice roller that he is using that helps with his symptoms.  He is also doing some stretching exercises.  Due to continued symptoms physiatry referral placed and hand therapy referral requested.  Plan of care discussed with patient.  He is currently on light duty, but had last week off.    ROS: All systems were reviewed on intake form, form was reviewed and signed. See scanned documents in media. Pertinent positives and negatives included in HPI.    PMH: No pertinent past medical history to this problem  MEDS: Medications were  "reviewed in Epic  ALLERGIES: No Known Allergies  SOCHX: Works as  at [employer]  FH: No pertinent family history to this problem       Objective:     /82 (BP Location: Left arm, Patient Position: Sitting, BP Cuff Size: Adult)   Pulse 73   Temp 35.9 °C (96.7 °F) (Temporal)   Resp 16   Ht 1.778 m (5' 10\")   Wt 114 kg (251 lb)   SpO2 95%   BMI 36.01 kg/m²     [unfilled]    Right wrist: Full range of motion, positive Finkelstein's, distal neurovascular intact, cap refill is brisk, strength 5/5.  Negative Tinel's and negative Phalen's.  Left wrist: Full range of motion, negative Finkelstein's negative Phalen's, and negative Tinel's, neurovascular intact, cap refill is brisk, strength 5/5.      Assessment/Plan:       1. Overuse injury  - Referral to Occupational Therapy  - Referral to Pain Clinic    2. Tendonitis of both wrists  - Referral to Occupational Therapy  - Referral to Pain Clinic    Released to Restricted Duty FROM 4/24/2024 TO 5/20/2024   Continue weight restrictions of no lifting, carrying, pushing, or pulling anything heavier than 10 pounds.  He should try to limit his fine manipulations from grasping to less than 2 hours/day.  Follow-up in 4 weeks, unless seen by physiatry  Restricted duty, per physiatry  Physiatry referral placed, transfer care  Hand therapy referral placed  Recommend continue OTC Tylenol/ibuprofen, ice/heat application, and OTC topical ointment of your choosing  Recommend gentle range of motion and stretching exercises as demonstrated  Recommend use of wrist brace while at work otherwise wean at home as tolerated  Return to clinic with new or worsening symptoms; if after hours go to ED for further evaluation and management of symptoms    Differential diagnosis, natural history, supportive care, and indications for immediate follow-up discussed.    Approximately 25 minutes were spent in reviewing notes, preparing for visit, obtaining history, exam and " evaluation, patient counseling/education and post visit documentation/orders.

## 2024-04-24 NOTE — LETTER
PHYS[facility]IAN[facility]YS[facility]IAN[facility]                       PROGRESS REPORT  CERTIFICATION OF DISABILITY Claim Number:        Social Security Number:     Patient’s Name:Edson Pichardo III Date of Injury:  8/16/2023     Employer:  [employer]  Name of O (if applicable)   Patient’s Job Description/Occupation:      Previous Injuries/Diseases/Surgeries Contributing to the Condition:      Diagnosis:  (X50.3XXA) Overuse injury  (M77.8) Tendonitis of both wristsDiagnoses of Overuse injury and Tendonitis of both wrists were pertinent to this visit.   Related to the Industrial Injury? Yes   Explain:   Objective Medical Findings: Right wrist: Full range of motion, positive Finkelstein's, distal neurovascular intact, cap refill is brisk, strength 5/5.  Negative Tinel's and negative Phalen's.  Left wrist: Full range of motion, negative Finkelstein's negative Phalen's, and negative Tinel's, neurovascular intact, cap refill is brisk, strength 5/5.      []  None - Discharged                         Stable     [x]  Yes     []  No                           Ratable     []  Yes     [x]  No   []  Generally Improved                        []  Condition Worsened                              [x]  Condition Same         May Have Suffered a Permanent Disability     []  Yes     [x]  No   Treatment Plan:   Follow-up in 4 weeks, unless seen by physiatry  Restricted duty, per physiatry  Physiatry referral placed, transfer care  Hand therapy referral placed  Recommend continue OTC Tylenol/ibuprofen, ice/heat application, and OTC topical ointment of your choosing  Recommend gentle range of motion and stretching exercises as demonstrated  Recommend use of wrist brace while at work otherwise wean at home as tolerated  Return to clinic with new or worsening symptoms; if after hours go to ED for further evaluation and management of symptoms     [] No Change in Therapy                    [x]  PT/OT Prescribed                   [] Medication May be Used While Working    [] Case Management                          [] PT/OT Discontinued  [x]  Consultation    [] Further Diagnostic Studies    []  Prescription(s)                        [] Released to FULL DUTY /No Restrictions on (Date):                                                                                           [] Certified TOTALLY TEMPORARILY DISABLED (Indicate Dates): From:                       To:                               [x] Released to RESTRICTED/Modified Duty on (Date): From: 4/24/24 To: 5/20/24                                  Restrictions Are:     []  Permanent[x]  Temporary   [] No Sitting                   []  No Standing          []  No Pulling             [x]  Other: Continue weight restrictions of no lifting, carrying, pushing, or pulling anything heavier than 10 pounds.  He should try to limit his fine manipulations from grasping to less than 2 hours/day.  [] No Bending at Waist  []  No Stooping          []  No Lifting                                                                            [] No Carrying                []  No Walking           []  Lifting Restricted to (lbs.):< or = to 10 pounds  [] No Pushing                 []  No Climbing         []  No Reaching Above Shoulders   Date of Next Visit: 5/20/2024  @ 11:00 AM Date of this Exam:  4/24/2024 Physician/Chiropractic Physician Name: DEEPAK Cowan Physician/Chiropractic Physician Signature:   Rell Clay DO MPH   D-39 (Rev. 2/24)

## 2024-05-20 ENCOUNTER — OCCUPATIONAL MEDICINE (OUTPATIENT)
Dept: OCCUPATIONAL MEDICINE | Facility: CLINIC | Age: 29
End: 2024-05-20
Payer: COMMERCIAL

## 2024-05-20 VITALS
HEIGHT: 70 IN | TEMPERATURE: 96.5 F | SYSTOLIC BLOOD PRESSURE: 126 MMHG | WEIGHT: 253.2 LBS | RESPIRATION RATE: 16 BRPM | BODY MASS INDEX: 36.25 KG/M2 | OXYGEN SATURATION: 96 % | HEART RATE: 98 BPM | DIASTOLIC BLOOD PRESSURE: 84 MMHG

## 2024-05-20 DIAGNOSIS — M77.8 TENDONITIS OF BOTH WRISTS: ICD-10-CM
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
## 2024-05-20 PROCEDURE — 3079F DIAST BP 80-89 MM HG: CPT | Performed by: NURSE PRACTITIONER

## 2024-05-20 PROCEDURE — 3074F SYST BP LT 130 MM HG: CPT | Performed by: NURSE PRACTITIONER

## 2024-05-20 PROCEDURE — 99213 OFFICE O/P EST LOW 20 MIN: CPT | Performed by: NURSE PRACTITIONER

## 2024-05-20 ASSESSMENT — ENCOUNTER SYMPTOMS
RESPIRATORY NEGATIVE: 1
PSYCHIATRIC NEGATIVE: 1
MYALGIAS: 0
TINGLING: 0
CONSTITUTIONAL NEGATIVE: 1
SENSORY CHANGE: 0
CARDIOVASCULAR NEGATIVE: 1
WEAKNESS: 0

## 2024-05-20 NOTE — PROGRESS NOTES
Subjective:     Edson Pichardo III is a 29 y.o. male who presents for Follow-Up (DOI: 8/16/23 left and right arms better )  (Copied from initial visit) DOI: 8/16/2023.  Bilateral wrist pain.  There is no traumatic injury event or precipitating injury.  He notes he has chronic recurrent bilateral wrist pain left worse than right.  He attributes his pain to repetitive motion and fine motor manipulations associated to his job.  He has no history of previous wrist pain.  Symptoms all started once he began this job.  Pain is in the bilateral radial wrist into the mid forearm.  There is no swelling, deformity, erythema or ecchymosis.  Intermittent tingling but no significant numbness or weakness.  He has tried OTC meds and conservative measures without relief.  He notes symptoms are better on his days off.  No previous injury.  No second job.      Today patient states that symptoms are improving.  He has not noticed any increase in the baseline discomfort.  He states he has not noted any increase in the pulling sensation at the base of both thumbs that is radiating up the arms since he has been on leave.  He does not have any numbness, weakness, tingling, or swelling.  He does not need any OTC medications and will take it if necessary.  He is is not using the wrist splints.  He continues to utilize the ice roller if needed for his symptoms.  He is also doing some stretching exercises.  Hand therapy has been approved, pending scheduling referral information provided to patient.  Physiatry is currently pending.  Plan of care discussed with patient.  He would like to do a week of trial full duty to see how his wrists are doing.  Plan of care discussed with patient.     Review of Systems   Constitutional: Negative.    Respiratory: Negative.     Cardiovascular: Negative.    Genitourinary: Negative.    Musculoskeletal:  Negative for joint pain and myalgias.   Skin: Negative.    Neurological:  Negative for tingling, sensory  "change and weakness.   Psychiatric/Behavioral: Negative.         SOCHX: Works as a  at [employer]  FH: No pertinent family history to this problem.       Objective:     /84 (BP Location: Right arm, Patient Position: Sitting, BP Cuff Size: Adult)   Pulse 98   Temp 35.8 °C (96.5 °F) (Temporal)   Resp 16   Ht 1.778 m (5' 10\")   Wt 115 kg (253 lb 3.2 oz)   SpO2 96%   BMI 36.33 kg/m²     Constitutional: Patient is in no acute distress. Appears well-developed and well-nourished.   Cardiovascular: Normal rate.    Pulmonary/Chest: Effort normal. No respiratory distress.   Neurological: Patient is alert and oriented to person, place, and time.   Skin: Skin is warm and dry.   Psychiatric: Normal mood and affect. Behavior is normal.      Right wrist: Full range of motion, positive Finkelstein's, distal neurovascular intact, cap refill is brisk, strength 5/5.  Negative Tinel's and negative Phalen's.  Left wrist: Full range of motion, negative Finkelstein's negative Phalen's, and negative Tinel's, neurovascular intact, cap refill is brisk, strength 5/5.      Assessment/Plan:       1. Overuse injury    2. Tendonitis of both wrists      FROM   5/20/24 TO   5/29/24            Differential diagnosis, natural history, supportive care, and indications for immediate follow-up discussed.    Work Status: Full Duty - see D-39 or other state/federal worker's compensation forms for specific restrictions if applicable  Follow up 1 week    Differential diagnosis, natural history, supportive care, and indications for immediate follow-up discussed.    Approximately 30 minutes were spent in reviewing notes, preparing for visit, obtaining history, exam and evaluation, patient counseling/education, and post visit documentation/orders. Significant time was spent completing state/federal worker's compensation forms.    "

## 2025-04-21 ENCOUNTER — OFFICE VISIT (OUTPATIENT)
Dept: URGENT CARE | Facility: CLINIC | Age: 30
End: 2025-04-21
Payer: COMMERCIAL

## 2025-04-21 VITALS
HEART RATE: 66 BPM | DIASTOLIC BLOOD PRESSURE: 80 MMHG | RESPIRATION RATE: 12 BRPM | WEIGHT: 250.1 LBS | BODY MASS INDEX: 35.01 KG/M2 | HEIGHT: 71 IN | TEMPERATURE: 97.4 F | OXYGEN SATURATION: 96 % | SYSTOLIC BLOOD PRESSURE: 128 MMHG

## 2025-04-21 DIAGNOSIS — G47.00 INSOMNIA, UNSPECIFIED TYPE: ICD-10-CM

## 2025-04-21 PROCEDURE — 3074F SYST BP LT 130 MM HG: CPT

## 2025-04-21 PROCEDURE — 3079F DIAST BP 80-89 MM HG: CPT

## 2025-04-21 PROCEDURE — 99213 OFFICE O/P EST LOW 20 MIN: CPT

## 2025-04-21 RX ORDER — HYDROXYZINE HYDROCHLORIDE 25 MG/1
25 TABLET, FILM COATED ORAL 3 TIMES DAILY PRN
Qty: 30 TABLET | Refills: 0 | Status: SHIPPED | OUTPATIENT
Start: 2025-04-21

## 2025-04-21 ASSESSMENT — LIFESTYLE VARIABLES: SUBSTANCE_ABUSE: 0

## 2025-04-21 ASSESSMENT — ENCOUNTER SYMPTOMS
INSOMNIA: 1
DEPRESSION: 0

## 2025-04-21 NOTE — LETTER
April 21, 2025    To Whom It May Concern:         This is confirmation that Edson Pichardo III attended his scheduled appointment with DEEPAK Salcedo on 4/21/25. Please excuse 04/20 and 04/21.          If you have any questions please do not hesitate to call me at the phone number listed below.    Sincerely,          ANTON Salcedo.  [phone]

## 2025-04-22 NOTE — PROGRESS NOTES
"Chief Complaint   Patient presents with    Insomnia     X2 days.       HISTORY OF PRESENT ILLNESS: Patient is a pleasant 30 y.o. male who presents to urgent care today complaints of insomnia for the last 2 days.  Patient denies any major depression, he works at night and states that he believes his sleep schedule is off.  He has not taken anything except for melatonin.  He does not exercise regularly.  He denies any suicidal or homicidal ideation.    There are no active problems to display for this patient.      Allergies:Patient has no known allergies.    Current Outpatient Medications Ordered in Epic   Medication Sig Dispense Refill    hydrOXYzine HCl (ATARAX) 25 MG Tab Take 1 Tablet by mouth 3 times a day as needed for Anxiety (To help with sleep). 30 Tablet 0     No current Epic-ordered facility-administered medications on file.       History reviewed. No pertinent past medical history.    Social History     Tobacco Use    Smoking status: Never    Smokeless tobacco: Never   Vaping Use    Vaping status: Never Used   Substance Use Topics    Alcohol use: Not Currently    Drug use: Never       No family status information on file.   History reviewed. No pertinent family history.    Review of Systems   Psychiatric/Behavioral:  Negative for depression, substance abuse and suicidal ideas. The patient has insomnia.        Exam:  /80 (BP Location: Left arm, Patient Position: Sitting, BP Cuff Size: Large adult)   Pulse 66   Temp 36.3 °C (97.4 °F) (Temporal)   Resp 12   Ht 1.803 m (5' 11\")   Wt 113 kg (250 lb 1.6 oz)   SpO2 96%   Physical Exam  Vitals reviewed.   Constitutional:       General: He is not in acute distress.     Appearance: Normal appearance.   HENT:      Head: Normocephalic.      Right Ear: Tympanic membrane and ear canal normal. There is no impacted cerumen. Tympanic membrane is not injected or erythematous.      Left Ear: Tympanic membrane and ear canal normal. There is no impacted cerumen. " Tympanic membrane is not injected or erythematous.      Mouth/Throat:      Mouth: Mucous membranes are moist.      Pharynx: Oropharynx is clear. No oropharyngeal exudate.      Tonsils: No tonsillar exudate. 0 on the right. 0 on the left.   Eyes:      General:         Right eye: No discharge.         Left eye: No discharge.      Extraocular Movements: Extraocular movements intact.      Conjunctiva/sclera: Conjunctivae normal.      Pupils: Pupils are equal, round, and reactive to light.   Cardiovascular:      Rate and Rhythm: Normal rate and regular rhythm.      Pulses: Normal pulses.      Heart sounds: Normal heart sounds. No murmur heard.  Pulmonary:      Effort: Pulmonary effort is normal. No respiratory distress.      Breath sounds: Normal breath sounds. No stridor. No wheezing.   Musculoskeletal:         General: Normal range of motion.      Cervical back: Normal range of motion and neck supple.   Skin:     General: Skin is warm and dry.      Capillary Refill: Capillary refill takes less than 2 seconds.      Findings: No rash.   Neurological:      General: No focal deficit present.      Mental Status: He is alert.      Sensory: No sensory deficit.      Motor: No weakness.   Psychiatric:         Mood and Affect: Mood normal.         Behavior: Behavior normal.             Assessment/Plan:  1. Insomnia, unspecified type  - hydrOXYzine HCl (ATARAX) 25 MG Tab; Take 1 Tablet by mouth 3 times a day as needed for Anxiety (To help with sleep).  Dispense: 30 Tablet; Refill: 0    Based on physical exam along with review of systems I did encourage increasing exercise as this is the #1 treatment for ongoing insomnia especially since this appears to be acute.  Likely his working at night is a contributing factor as well.  Will attempt a course of Atarax, encouraged the use of Benadryl over-the-counter as well.  Patient encouraged to drink plenty of fluids, eat a good diet, should he continue to get worse there was a referral  placed for ongoing primary care as well.  Patient denies any major depression, suicidal ideation or homicidal ideation.    Supportive care, differential diagnoses, and indications for immediate follow-up discussed with patient.   Pathogenesis of diagnosis discussed including typical length and natural progression.   Instructed to return to clinic or nearest emergency department for any change in condition, further concerns, or worsening of symptoms.  Patient states understanding of the plan of care and discharge instructions.  Instructed to make an appointment, for follow up, with primary care provider.    Please note that this dictation was created using voice recognition software. I have made every reasonable attempt to correct obvious errors, but I expect that there are errors of grammar and possibly content that I did not discover before finalizing the note.      Joslyn LEE